# Patient Record
Sex: MALE | Employment: FULL TIME | URBAN - METROPOLITAN AREA
[De-identification: names, ages, dates, MRNs, and addresses within clinical notes are randomized per-mention and may not be internally consistent; named-entity substitution may affect disease eponyms.]

---

## 2017-03-18 ENCOUNTER — VIRTUAL VISIT (OUTPATIENT)
Dept: FAMILY MEDICINE | Facility: OTHER | Age: 37
End: 2017-03-18

## 2017-03-18 NOTE — PROGRESS NOTES
Date:   Clinician: Jose Alejandro Golden  Clinician NPI: [id number]  Patient: Ruy Medina  Patient : 1980  Patient Address: [address] , [place] [address]  Patient Phone: [phone]  Visit Protocol: URI  Patient Summary:  Ruy is a 36 year old ( : 1980 ) male who initiated a Zip for evaluation of bronchitis.      His  symptoms started gradually 2 weeks ago  and consist of myalgias, malaise, fever, cough, chills, rhinitis, post-nasal drainage, and nasal congestion.   He denies ear pain, chest pain, loss of appetite, facial pain or pressure, itchy eyes, nausea, vomiting, dyspnea, dysphagia, sore throat, hoarse voice, and headache. He denies a history of facial surgery.   His profuse nasal secretions are white. When asked to feel his neck he denied feeling enlarged lymph nodes. He cannot tell if axillary lymphadenopathy is present.   His moderately severe (cough every 5-10 minutes) productive cough is more bothersome at night. He believes the cough is not caused by post-nasal drainage. His cough produces white sputum.   He has passed urine in the past 12 hours. He denies rigors.   Ruy denies having COPD or other chronic lung disease.   Pulse: Not measured beats in 10 seconds.    Weight (in lbs): 265   Ruy smokes or uses smokeless tobacco.    MEDICATIONS:  No current medications , ALLERGIES:  NKDA   Clinician Response:  Dear Ruy,  Based on the information you have provided, you likely have  acute sinusitis, otherwise known as a sinus infection.   I am prescribing fluticasone propionate nasal (Flonase) 50 mcg/spray. Take one or two inhalations in each nostril one time a day. There are no refills with this prescription.   I am prescribing amoxicillin-Pot Clavulanate [Augmentin] 875-125mg. Take one tablet by mouth two times a day for 10 days. There are no refills with this prescription.   Unless your are allergic to the over-the-counter medication(s) below, I recommend using:   A sinus  irrigation kit such as Sinus Rinse, Neti Pot, SinuCleanse (or store brand). Be sure to use sterile or previously boiled water to prevent unwanted infections.   Guaifenesin + dextromethorphan (Robitussin DM, Mucinex DM). This is an over-the-counter medication that does not require a prescription.   A decongestant such as pseudoephedrine (Sudafed or store brand) to help your symptoms. This is an over-the-counter medication that does not require a prescription.   Ibuprofen. Take 1-3 200mg tablets (200-600mg) every 8 hours to help with the discomfort. Make sure to take the ibuprofen with food. Do not exceed 2400mg in 24 hours. This is an over-the-counter medication that does not require a prescription.   Drink plenty of liquids, especially water and take time to rest your body. This may mean taking a nap or going to bed earlier. Your body is fighting an infection and liquids and rest will improve the pace of recovery. Remember to regularly wash your hands and avoid close contact with others to prevent spreading your infection.   Some people develop allergies to antibiotics. If you notice a new rash, significant swelling or difficulty breathing, stop the medication immediately and go into a clinic for physical evaluation.   Finally, as your clinician, I need you to know that becoming tobacco-free is the most important thing you can do to protect your current and future health.   Diagnosis: Acute Sinusitis  Diagnosis ICD: J01.90  Prescription: amoxicillin-Pot Clavulanate (Augmentin) 875-125 mg oral tablet 20 tablets, 10 days supply. Take one tablet by mouth two times a day for 10 days. Refills: 0, Refill as needed: no, Allow substitutions: yes  Prescription: fluticasone propionate (Flonase) 50mcg nasal spray 16 gm, 30 days supply. Take one or two inhalations in each nostril one time a day. Refills: 0, Refill as needed: no, Allow substitutions: yes  Prescription Sent At: March 18 17:40:55, 2017  Pharmacy: [facility]  [place] - [phone] - [address], [place] [address]